# Patient Record
Sex: FEMALE | Race: BLACK OR AFRICAN AMERICAN | NOT HISPANIC OR LATINO | Employment: FULL TIME | URBAN - METROPOLITAN AREA
[De-identification: names, ages, dates, MRNs, and addresses within clinical notes are randomized per-mention and may not be internally consistent; named-entity substitution may affect disease eponyms.]

---

## 2018-03-21 ENCOUNTER — HOSPITAL ENCOUNTER (EMERGENCY)
Facility: HOSPITAL | Age: 38
Discharge: LEFT BEFORE DISCHARGE | End: 2018-03-21
Attending: EMERGENCY MEDICINE
Payer: OTHER GOVERNMENT

## 2018-03-21 VITALS
HEART RATE: 79 BPM | OXYGEN SATURATION: 99 % | DIASTOLIC BLOOD PRESSURE: 62 MMHG | TEMPERATURE: 97.7 F | WEIGHT: 170 LBS | RESPIRATION RATE: 20 BRPM | SYSTOLIC BLOOD PRESSURE: 132 MMHG

## 2018-03-21 DIAGNOSIS — R10.9 ABDOMINAL PAIN IN PREGNANCY: Primary | ICD-10-CM

## 2018-03-21 DIAGNOSIS — O26.899 ABDOMINAL PAIN IN PREGNANCY: Primary | ICD-10-CM

## 2018-03-21 DIAGNOSIS — Z3A.21 21 WEEKS GESTATION OF PREGNANCY: ICD-10-CM

## 2018-03-21 PROCEDURE — 99283 EMERGENCY DEPT VISIT LOW MDM: CPT

## 2018-03-21 NOTE — ED PROVIDER NOTES
History  Chief Complaint   Patient presents with    Abdominal Pain Pregnant     Pt visiting from [place] and has been having LLQ cramping  Pt 21 weeks pregnant  Pt denies vaginal discharge/bleeding  31 y/o female,  here 21 weeks pregnant with pelvic cramping  Began yesterday  She was driving when it started  Since then it has been intermittent, getting progressively more severe  Nothing she does makes it better or worse  Denies any vaginal bleeding or vaginal discharge with this  No fevers or chills  Nauseous but no vomiting  Normal urination normal bowel movements  No leg pain or swelling  She has no prior miscarriages  Thus far the pregnancy has been uncomplicated  She is from [place], [place], follows with Ob there          History provided by:  Patient   used: No    Abdominal Pain   Pain location:  Suprapubic  Pain quality: cramping    Pain radiates to:  LLQ and RLQ  Pain severity:  Moderate  Onset quality:  Gradual  Duration:  1 day  Timing:  Intermittent  Progression:  Worsening  Chronicity:  New  Context: not alcohol use, not awakening from sleep, not diet changes, not eating, not laxative use, not medication withdrawal, not previous surgeries, not recent illness, not recent sexual activity, not recent travel, not retching, not sick contacts, not suspicious food intake and not trauma    Relieved by:  Nothing  Worsened by:  Nothing  Ineffective treatments:  None tried  Associated symptoms: no anorexia, no belching, no chest pain, no chills, no constipation, no cough, no diarrhea, no dysuria, no fatigue, no fever, no flatus, no hematemesis, no hematochezia, no hematuria, no melena, no nausea, no shortness of breath, no sore throat, no vaginal bleeding, no vaginal discharge and no vomiting    Risk factors: pregnancy    Risk factors: no alcohol abuse, no aspirin use, not elderly, has not had multiple surgeries, no NSAID use, not obese and no recent hospitalization None       History reviewed  No pertinent past medical history  History reviewed  No pertinent surgical history  History reviewed  No pertinent family history  I have reviewed and agree with the history as documented  Social History   Substance Use Topics    Smoking status: Never Smoker    Smokeless tobacco: Never Used    Alcohol use No        Review of Systems   Constitutional: Negative for activity change, appetite change, chills, diaphoresis, fatigue, fever and unexpected weight change  HENT: Negative for congestion, rhinorrhea, sinus pressure, sore throat and trouble swallowing  Eyes: Negative for photophobia and visual disturbance  Respiratory: Negative for apnea, cough, choking, chest tightness, shortness of breath, wheezing and stridor  Cardiovascular: Negative for chest pain, palpitations and leg swelling  Gastrointestinal: Positive for abdominal pain  Negative for abdominal distention, anorexia, blood in stool, constipation, diarrhea, flatus, hematemesis, hematochezia, melena, nausea and vomiting  Genitourinary: Negative for decreased urine volume, difficulty urinating, dysuria, enuresis, flank pain, frequency, hematuria, urgency, vaginal bleeding and vaginal discharge  Musculoskeletal: Negative for arthralgias, myalgias, neck pain and neck stiffness  Skin: Negative for color change, pallor, rash and wound  Allergic/Immunologic: Negative  Neurological: Negative for dizziness, tremors, syncope, weakness, light-headedness, numbness and headaches  Hematological: Negative  Psychiatric/Behavioral: Negative  All other systems reviewed and are negative        Physical Exam  ED Triage Vitals   Temperature Pulse Respirations Blood Pressure SpO2   03/21/18 1411 03/21/18 1408 03/21/18 1408 03/21/18 1408 03/21/18 1408   97 7 °F (36 5 °C) 79 20 132/62 99 %      Temp Source Heart Rate Source Patient Position - Orthostatic VS BP Location FiO2 (%)   03/21/18 1411 03/21/18 1408 03/21/18 1408 03/21/18 1408 --   Oral Monitor Sitting Left arm       Pain Score       03/21/18 1408       6           Orthostatic Vital Signs  Vitals:    03/21/18 1408   BP: 132/62   Pulse: 79   Patient Position - Orthostatic VS: Sitting       Physical Exam   Constitutional: She is oriented to person, place, and time  She appears well-developed and well-nourished  Non-toxic appearance  She does not have a sickly appearance  She does not appear ill  No distress  HENT:   Head: Normocephalic and atraumatic  Eyes: EOM and lids are normal  Pupils are equal, round, and reactive to light  Neck: Normal range of motion  Neck supple  Cardiovascular: Normal rate, regular rhythm, S1 normal, S2 normal, normal heart sounds, intact distal pulses and normal pulses  Exam reveals no gallop, no distant heart sounds, no friction rub and no decreased pulses  No murmur heard  Pulses:       Radial pulses are 2+ on the right side, and 2+ on the left side  Pulmonary/Chest: Effort normal and breath sounds normal  No accessory muscle usage  No apnea, no tachypnea and no bradypnea  No respiratory distress  She has no decreased breath sounds  She has no wheezes  She has no rhonchi  She has no rales  Abdominal: Soft  Normal appearance and bowel sounds are normal  She exhibits no distension and no mass  There is tenderness in the right lower quadrant, suprapubic area and left lower quadrant  There is no rigidity, no rebound and no guarding  No hernia  Musculoskeletal: Normal range of motion  She exhibits no edema, tenderness or deformity  Neurological: She is alert and oriented to person, place, and time  No cranial nerve deficit  GCS eye subscore is 4  GCS verbal subscore is 5  GCS motor subscore is 6  GCS 15  AAOx3  Ambulating in department without difficulty  CN II-XII grossly intact  No focal neuro deficits  Skin: Skin is warm, dry and intact  No rash noted  She is not diaphoretic  No erythema  No pallor  Psychiatric: Her speech is normal    Nursing note and vitals reviewed  ED Medications  Medications - No data to display    Diagnostic Studies  Results Reviewed     None                 No orders to display              Procedures  Procedures       Phone Contacts  ED Phone Contact    ED Course  ED Course as of Mar 21 1612   Wed Mar 21, 2018   1415 Bedside US shows:   Moving fetus  [facility]  They are aware of patient coming over  MDM  Number of Diagnoses or Management Options  21 weeks gestation of pregnancy: new and requires workup  Abdominal pain in pregnancy: new and requires workup  Diagnosis management comments: DDx including but not limited to: threatened , ectopic pregnancy, ovarian torsion, ovarian cyst, ruptured ovarian cyst, mesenteric adenitis, gastritis, PUD, GERD, gastroparesis, pancreatitis, hepatitis, IBD, IBS, ileus, colitis, enteritis, renal colic, pyelonephritis, UTI, biliary colic, choledocholithiasis, perforated viscus, splenic etiology, constipation; doubt bowel obstruction or appendicitis or cholecystitis or volvulus  Risk of Complications, Morbidity, and/or Mortality  Presenting problems: moderate  Management options: low  General comments: Plan/MDM: 31 y/o female  21 weeks pregnant here for abdominal pain  Bedside US confirms IUP    No OB care here at [facility]  Offered transfer to nearest [facility]  Pt would prefer to go by private transportation  Risks and benefits discussed  She signed against medical advice  Stable at time of leaving      Patient Progress  Patient progress: stable    CritCare Time    Disposition  Final diagnoses:   Abdominal pain in pregnancy   21 weeks gestation of pregnancy     Time reflects when diagnosis was documented in both MDM as applicable and the Disposition within this note     Time User Action Codes Description Comment    3/21/2018  2:16 PM Priti Cruz Add [O26 899,  R10 9] Abdominal pain in pregnancy     3/21/2018  2:16 PM Yo Agee [Z3A 21] 21 weeks gestation of pregnancy       ED Disposition     ED Disposition Condition Comment    AMA  Date: 3/21/2018  Patient: Diego Oshea  Admitted: 3/21/2018  2:04 PM  Attending Provider: Brayton Hodgkins, DO    Diego Oshea or her authorized caregiver has made the decision for the patient to leave the emergency department against the advice of Ronald pruitt PA-C  She or her authorized caregiver has been informed and understands the inherent risks, including death, infection, premature labor  She or her authorized caregiver has decided to accept the responsibility for this decision  Diego Oshea and all  necessary parties have been advised that she may return for further evaluation or treatment  Her condition at time of discharge was Stable  Diego Oshea had current vital signs as follows:  /62 (BP Location: Left arm)   Pulse 79   Temp 97 7 °F ( 36 5 °C) (Oral)   Resp 20   Wt 77 1 kg (170 lb)         Follow-up Information     Follow up With Specialties Details Why [address] [facility] ([place], [place]) Emergency Medicine  go directly to ER at [place] [id number]          There are no discharge medications for this patient  No discharge procedures on file      ED Provider  Electronically Signed by           Manish Duvall PA-C  03/21/18 3783

## 2018-03-21 NOTE — DISCHARGE INSTRUCTIONS
Abdominal Pain in Pregnancy   WHAT YOU NEED TO KNOW:   Abdominal pain during pregnancy is common  Some of the causes include heartburn, constipation, gas, false labor, and round ligament pain  Round ligament pain is caused by stretching of the ligaments that support your uterus  Abdominal pain may be caused by a health problem, such as a stomach virus or appendicitis (inflammation of the appendix)  The pain may also be caused by a problem with your pregnancy, such as a threatened miscarriage or  labor  DISCHARGE INSTRUCTIONS:   Follow up with your obstetrician within 3 days:  Write down your questions so you remember to ask them during your visits  Self-care:   · Rest may help to relieve round ligament pain  Ask your healthcare provider about other ways to relieve this pain, such as a supportive belt or pregnancy exercises  · Use a heating pad set to the lowest setting or a hot compress to apply heat to your abdomen  Do this for 20 to 30 minutes every 2 hours for as many days as directed  · Avoid quick changes in position or movements that cause pain  · Do not lie flat in bed or bend over if you have heartburn  Ask your obstetrician if you should make any changes to the foods you eat  Ask if you can take any medicines for heartburn  · Eat more fiber and drink more liquids to relieve constipation  Fiber is found in fruits, vegetables, and whole-grain foods, such as whole-wheat bread and cereals  Ask how much liquid to drink each day and which liquids are best for you  Contact your obstetrician if:  · You continue to have abdominal pain that cannot be relieved  · You have a fever  · You have questions or concerns about your condition or care  Return to the emergency department if:   · You have sudden, severe pain or cramps that are so bad that you cannot walk or talk  · You have a fast heartbeat, shortness of breath, and you feel lightheaded or faint       · You have vaginal bleeding or discharge  · You have nausea, vomiting, fever, and severe pain on your right side  © 2017 [facility] Information is for End User's use only and may not be sold, redistributed or otherwise used for commercial purposes  All illustrations and images included in CareNotes® are the copyrighted property of [employer]  or Benito Escalante  The above information is an  only  It is not intended as medical advice for individual conditions or treatments  Talk to your doctor, nurse or pharmacist before following any medical regimen to see if it is safe and effective for you